# Patient Record
Sex: MALE | Race: WHITE | NOT HISPANIC OR LATINO | URBAN - METROPOLITAN AREA
[De-identification: names, ages, dates, MRNs, and addresses within clinical notes are randomized per-mention and may not be internally consistent; named-entity substitution may affect disease eponyms.]

---

## 2019-07-06 ENCOUNTER — EMERGENCY (EMERGENCY)
Facility: HOSPITAL | Age: 39
LOS: 1 days | Discharge: HOME | End: 2019-07-06
Attending: EMERGENCY MEDICINE | Admitting: EMERGENCY MEDICINE
Payer: COMMERCIAL

## 2019-07-06 VITALS
DIASTOLIC BLOOD PRESSURE: 73 MMHG | TEMPERATURE: 98 F | SYSTOLIC BLOOD PRESSURE: 111 MMHG | HEART RATE: 72 BPM | WEIGHT: 199.96 LBS | OXYGEN SATURATION: 99 % | RESPIRATION RATE: 16 BRPM

## 2019-07-06 DIAGNOSIS — R10.30 LOWER ABDOMINAL PAIN, UNSPECIFIED: ICD-10-CM

## 2019-07-06 DIAGNOSIS — K40.90 UNILATERAL INGUINAL HERNIA, WITHOUT OBSTRUCTION OR GANGRENE, NOT SPECIFIED AS RECURRENT: ICD-10-CM

## 2019-07-06 PROCEDURE — 99283 EMERGENCY DEPT VISIT LOW MDM: CPT

## 2019-07-06 NOTE — ED ADULT TRIAGE NOTE - CHIEF COMPLAINT QUOTE
reports pain right groin today with lump/hernia that he is not able to reduce reports pain right groin today with lump/hernia that he is not able to reduce, sent from urgent care

## 2019-07-06 NOTE — ED PROVIDER NOTE - OBJECTIVE STATEMENT
37 yo M w/ history of left inguinal hernia repair 2013 c/o right groin pain w/ bulge today. Pt was shopping at the grocery store at onset of groin pain; pt denies heavy lifting or exercise prior to onset of symptoms. Pt states he felt a bulge later this afternoon after returning home from the store. Pt was sent in from [facility] for further evaluation. Denies fever, chills, N/V/D, hematuria, change in urinary/bowel function, dysuria, d/c, flank pain, HA, dizziness.

## 2019-07-06 NOTE — ED ADULT NURSE NOTE - OBJECTIVE STATEMENT
Pt sent from  for further evaluation of right groin pain with lump, r/o possible hernia, cannot reduce on his own. Started at 1700 today. Pt reports similar episode in the past x15 years ago on the left side and needed surgery. Denies any  symptoms.

## 2019-07-06 NOTE — ED ADULT NURSE NOTE - CHPI ED NUR SYMPTOMS NEG
no abdominal distension/no blood in stool/no chills/no nausea/no dysuria/no fever/no hematuria/no diarrhea

## 2019-07-06 NOTE — ED PROVIDER NOTE - NSFOLLOWUPINSTRUCTIONS_ED_ALL_ED_FT
follow up with general surgery - see information for dr. espino above     refrain from heavy lifting   stay well hydrated    return to ER if pain worsens and is persistent or for nay other concern

## 2019-07-06 NOTE — ED PROVIDER NOTE - CLINICAL SUMMARY MEDICAL DECISION MAKING FREE TEXT BOX
non complicated right inguinal hernia   referral to general surgery return precautions discussed      ED evaluation and management discussed with the patientl.  Close PMD and/or specialist follow up encouraged.  Strict ED return instructions discussed in detail for any worsening or new symptoms. The patient was given the opportunity to ask any questions about their discharge diagnosis and discharge instructions.  The patient verbalized understanding of these instructions and need to return to the ED for any worsening of illness or for any concern. The patient understands Emergency Department diagnosis is a preliminary diagnosis often based on limited information and that the patient must adhere to the follow-up plan as discussed. The patient tolerated PO fluids.  At the time of discharge from the Emergency Department, the patient is alert with fluent appropriate speech and ambulatory without difficulty.  A medical screening examination was performed and no emergency medical condition was identified.

## 2019-07-06 NOTE — ED PROVIDER NOTE - CARE PROVIDER_API CALL
Camilo Oliva)  Surgery  [address] [place], [place] [address]  Phone: [phone]  Fax: [phone]  Follow Up Time: 1-3 Days